# Patient Record
Sex: MALE | Race: WHITE | Employment: OTHER | URBAN - METROPOLITAN AREA
[De-identification: names, ages, dates, MRNs, and addresses within clinical notes are randomized per-mention and may not be internally consistent; named-entity substitution may affect disease eponyms.]

---

## 2017-07-16 RX ORDER — METOPROLOL SUCCINATE 25 MG/1
TABLET, EXTENDED RELEASE ORAL
Qty: 30 TAB | Refills: 0 | Status: SHIPPED | OUTPATIENT
Start: 2017-07-16 | End: 2017-08-21 | Stop reason: SDUPTHER

## 2017-09-25 ENCOUNTER — OFFICE VISIT (OUTPATIENT)
Dept: INTERNAL MEDICINE CLINIC | Age: 67
End: 2017-09-25

## 2017-09-25 VITALS
HEART RATE: 72 BPM | SYSTOLIC BLOOD PRESSURE: 129 MMHG | RESPIRATION RATE: 14 BRPM | BODY MASS INDEX: 25.46 KG/M2 | HEIGHT: 65 IN | DIASTOLIC BLOOD PRESSURE: 66 MMHG | WEIGHT: 152.8 LBS | OXYGEN SATURATION: 99 % | TEMPERATURE: 97.9 F

## 2017-09-25 DIAGNOSIS — E78.5 HYPERLIPIDEMIA LDL GOAL <100: Chronic | ICD-10-CM

## 2017-09-25 DIAGNOSIS — Z00.00 ROUTINE GENERAL MEDICAL EXAMINATION AT A HEALTH CARE FACILITY: Primary | ICD-10-CM

## 2017-09-25 DIAGNOSIS — I10 ESSENTIAL HYPERTENSION: Chronic | ICD-10-CM

## 2017-09-25 DIAGNOSIS — K59.00 CONSTIPATION, UNSPECIFIED CONSTIPATION TYPE: ICD-10-CM

## 2017-09-25 DIAGNOSIS — G56.02 CARPAL TUNNEL SYNDROME OF LEFT WRIST: ICD-10-CM

## 2017-09-25 DIAGNOSIS — I25.10 CORONARY ARTERY DISEASE INVOLVING NATIVE CORONARY ARTERY OF NATIVE HEART WITHOUT ANGINA PECTORIS: Chronic | ICD-10-CM

## 2017-09-25 RX ORDER — DOCUSATE SODIUM 100 MG/1
100 CAPSULE, LIQUID FILLED ORAL DAILY
COMMUNITY

## 2017-09-25 RX ORDER — LANOLIN ALCOHOL/MO/W.PET/CERES
CREAM (GRAM) TOPICAL
COMMUNITY
End: 2020-04-30

## 2017-09-25 RX ORDER — AMLODIPINE BESYLATE 2.5 MG/1
TABLET ORAL
COMMUNITY
Start: 2017-09-05 | End: 2019-01-07

## 2017-09-25 NOTE — PROGRESS NOTES
HISTORY OF PRESENT ILLNESS  Mitesh Ferrer is a 69 y.o. male. HPI  Subjective:   Mitesh Ferrer is a 69 y.o. male with hypertension. Hypertension ROS: taking medications as instructed, no medication side effects noted, no TIA's, no chest pain on exertion, no dyspnea on exertion, no swelling of ankles. New concerns: stable. Subjective:   Mitesh Ferrer is a 69 y.o. male with hyperlipidemia. Cardiovascular risk analysis - 69 y.o. male LDL goal is under 130. ROS: taking medications as instructed, no medication side effects noted, no TIA's, no chest pain on exertion, no dyspnea on exertion, no swelling of ankles. Tolerating meds, no myalgias or other side effects noted  New concerns: stable on medicine     CAD- stable sees cardiologist . On plavix and had a testing done sees     He goes to [facility] and goes into the left hip with 4 needles in sacroiliac area and deaden the nerve - the pain gets so bad that it goes from the left gluteal to the left knee- he can get the procedure every 6 months or three times a year     He has left carpal tunnel with pain and left wrist pain and feel like carpal tunnel is happening - right wrist is doing really well    He is seeing neurology for the dizziness - he went to [address] [facility] and he saw neurologist there and he had a CT scan  done per patient -  at [facility] [address] [facility] ; he can be walking and he starts drifting to his left and feels like a daze and goes away quickly -no assessment yet- they did give him some medicine to take for headache and he took it once and he had bad nightmares ; he does feel like he has short term memory loss ; [facility] ( he does not remember that we referred to [facility])    He does get constipated ; he gets some pain from being constipated but once he has a bowel movement it is gone - had normal colon last year   Review of Systems   Constitutional: Negative. Negative for chills, diaphoresis, fever, malaise/fatigue and weight loss. HENT: Negative for congestion, ear pain, hearing loss, nosebleeds, sore throat and tinnitus. Eyes: Negative for blurred vision, double vision and photophobia. Respiratory: Negative for cough, hemoptysis, sputum production, shortness of breath and wheezing. Cardiovascular: Negative for chest pain, palpitations, orthopnea, claudication, leg swelling and PND. Gastrointestinal: Negative for abdominal pain, blood in stool, constipation, diarrhea, heartburn, melena, nausea and vomiting. Genitourinary: Negative for dysuria, flank pain, frequency, hematuria and urgency. Musculoskeletal: Negative for back pain, joint pain, myalgias and neck pain. Skin: Negative. Negative for itching and rash. Neurological: Negative for dizziness, tingling, sensory change, speech change, focal weakness, seizures, loss of consciousness, weakness and headaches. Endo/Heme/Allergies: Negative for environmental allergies and polydipsia. Does not bruise/bleed easily. Psychiatric/Behavioral: Negative for depression, memory loss, substance abuse and suicidal ideas. The patient is not nervous/anxious and does not have insomnia. Physical Exam   Constitutional: He appears well-developed and well-nourished. HENT:   Head: Normocephalic and atraumatic. Right Ear: Tympanic membrane, external ear and ear canal normal.   Left Ear: Tympanic membrane, external ear and ear canal normal.   Nose: Nose normal.   Mouth/Throat: Uvula is midline, oropharynx is clear and moist and mucous membranes are normal.   Neck: Normal range of motion. Neck supple. No thyromegaly present. Cardiovascular: Normal rate, regular rhythm, normal heart sounds and intact distal pulses. Pulmonary/Chest: Effort normal and breath sounds normal.   Abdominal: Soft. Bowel sounds are normal.   Lymphadenopathy:     He has no cervical adenopathy. Psychiatric: He has a normal mood and affect.  His behavior is normal. Judgment and thought content normal. Nursing note and vitals reviewed. ASSESSMENT and PLAN  Diagnoses and all orders for this visit:    1. Routine general medical examination at a health care facility  - labs and health maintenance through the [place]- he will get me the records  2. Hyperlipidemia LDL goal <100-cont current dose of medicine   -     LIPID PANEL    3. Essential hypertension- at goal stable   -     METABOLIC PANEL, COMPREHENSIVE    4. Coronary artery disease involving native coronary artery of native heart without angina pectoris-no acute chest pain- seeing     5. Carpal tunnel syndrome of left wrist- we are going to have him go back to   -     REFERRAL TO ORTHOPEDIC SURGERY    6.  Constipation, unspecified constipation type - will try toa dd miralax  -     CBC W/O DIFF    PT gets his health maintenance and routine stuff done at [place] and will get me records       lab results and schedule of future lab studies reviewed with patient  reviewed diet, exercise and weight control  reviewed medications and side effects in detail

## 2017-09-26 ENCOUNTER — TELEPHONE (OUTPATIENT)
Dept: INTERNAL MEDICINE CLINIC | Age: 67
End: 2017-09-26

## 2017-09-26 DIAGNOSIS — G56.02 CARPAL TUNNEL SYNDROME ON LEFT: Primary | ICD-10-CM

## 2017-09-26 NOTE — TELEPHONE ENCOUNTER
Dr Shalom Varela  ([address] [place] [address])    [address] [place] [address] Road    Phone [phone]    Fax [phone]    G 56.02 Carpal Tunnel Syndrome of Left Wrist    10/02/2017  8 AM         [place]     [id number]

## 2017-09-26 NOTE — TELEPHONE ENCOUNTER
----- Message from Jermain Helm. Cash sent at 9/25/2017  5:32 PM EDT -----  Regarding: Referral Request  Contact: [phone]  You asked me to give you my appointment date with Dr. Graham Keep it is October 2nd 2017 @ 0800. Please forward a referral to him.     Thank [phone]

## 2017-09-27 LAB
ALBUMIN SERPL-MCNC: 4.1 G/DL (ref 3.6–4.8)
ALBUMIN/GLOB SERPL: 1.9 {RATIO} (ref 1.2–2.2)
ALP SERPL-CCNC: 72 IU/L (ref 39–117)
ALT SERPL-CCNC: 17 IU/L (ref 0–44)
AST SERPL-CCNC: 20 IU/L (ref 0–40)
BILIRUB SERPL-MCNC: 0.5 MG/DL (ref 0–1.2)
BUN SERPL-MCNC: 12 MG/DL (ref 8–27)
BUN/CREAT SERPL: 13 (ref 10–24)
CALCIUM SERPL-MCNC: 9.2 MG/DL (ref 8.6–10.2)
CHLORIDE SERPL-SCNC: 104 MMOL/L (ref 96–106)
CHOLEST SERPL-MCNC: 135 MG/DL (ref 100–199)
CO2 SERPL-SCNC: 30 MMOL/L (ref 18–29)
CREAT SERPL-MCNC: 0.9 MG/DL (ref 0.76–1.27)
ERYTHROCYTE [DISTWIDTH] IN BLOOD BY AUTOMATED COUNT: 15.1 % (ref 12.3–15.4)
GLOBULIN SER CALC-MCNC: 2.2 G/DL (ref 1.5–4.5)
GLUCOSE SERPL-MCNC: 91 MG/DL (ref 65–99)
HCT VFR BLD AUTO: 41.9 % (ref 37.5–51)
HDLC SERPL-MCNC: 47 MG/DL
HGB BLD-MCNC: 13.6 G/DL (ref 12.6–17.7)
INTERPRETATION, 910389: NORMAL
LDLC SERPL CALC-MCNC: 75 MG/DL (ref 0–99)
MCH RBC QN AUTO: 29.8 PG (ref 26.6–33)
MCHC RBC AUTO-ENTMCNC: 32.5 G/DL (ref 31.5–35.7)
MCV RBC AUTO: 92 FL (ref 79–97)
PLATELET # BLD AUTO: 203 X10E3/UL (ref 150–379)
POTASSIUM SERPL-SCNC: 5 MMOL/L (ref 3.5–5.2)
PROT SERPL-MCNC: 6.3 G/DL (ref 6–8.5)
RBC # BLD AUTO: 4.56 X10E6/UL (ref 4.14–5.8)
SODIUM SERPL-SCNC: 144 MMOL/L (ref 134–144)
TRIGL SERPL-MCNC: 65 MG/DL (ref 0–149)
VLDLC SERPL CALC-MCNC: 13 MG/DL (ref 5–40)
WBC # BLD AUTO: 7.4 X10E3/UL (ref 3.4–10.8)

## 2017-10-09 ENCOUNTER — TELEPHONE (OUTPATIENT)
Dept: INTERNAL MEDICINE CLINIC | Age: 67
End: 2017-10-09

## 2017-10-09 DIAGNOSIS — Z12.83 SCREENING FOR MALIGNANT NEOPLASM OF SKIN: Primary | ICD-10-CM

## 2017-10-09 NOTE — TELEPHONE ENCOUNTER
Patient Referral.  Pt is being seen this morning and would like this done today. Pt was advised that Ayush Corona has to approve this and there are no guarantees this visit would be covered.     Dr. Ivette Guillory [address] [place] [address] 22, [facility] [address]  (T)[phone]   (Y) [phone]          Z12.83 - Skin Cancer    [facility]  [id number]

## 2017-10-20 RX ORDER — TESTOSTERONE 20.25 MG/1.25G
40.5 GEL TOPICAL DAILY
Qty: 1 BOTTLE | Refills: 3 | Status: SHIPPED | OUTPATIENT
Start: 2017-10-20 | End: 2017-11-10

## 2017-10-20 NOTE — TELEPHONE ENCOUNTER
He has been in recently but has not utilized his medication since 2016. Not labs completed for med last visit. Do you want to refill?

## 2017-10-20 NOTE — TELEPHONE ENCOUNTER
----- Message from Cezar Patel sent at 10/19/2017  8:47 AM EDT -----  Regarding: Prescription Question  Contact: [phone]  I need a refill for androgen 1.62%

## 2017-11-10 RX ORDER — TESTOSTERONE 40.5 MG/2.5G
1 GEL TOPICAL DAILY
Qty: 30 PACKET | Refills: 4 | OUTPATIENT
Start: 2017-11-10 | End: 2017-11-14 | Stop reason: SDUPTHER

## 2017-11-14 RX ORDER — TESTOSTERONE 16.2 MG/G
GEL TRANSDERMAL
Qty: 75 G | Refills: 3 | OUTPATIENT
Start: 2017-11-14 | End: 2019-01-07

## 2018-04-04 ENCOUNTER — OFFICE VISIT (OUTPATIENT)
Dept: INTERNAL MEDICINE CLINIC | Age: 68
End: 2018-04-04

## 2018-04-04 ENCOUNTER — PATIENT MESSAGE (OUTPATIENT)
Dept: INTERNAL MEDICINE CLINIC | Age: 68
End: 2018-04-04

## 2018-04-04 ENCOUNTER — TELEPHONE (OUTPATIENT)
Dept: INTERNAL MEDICINE CLINIC | Age: 68
End: 2018-04-04

## 2018-04-04 ENCOUNTER — HOSPITAL ENCOUNTER (OUTPATIENT)
Dept: CT IMAGING | Age: 68
Discharge: HOME | End: 2018-04-04
Attending: INTERNAL MEDICINE
Payer: COMMERCIAL

## 2018-04-04 VITALS
HEART RATE: 61 BPM | BODY MASS INDEX: 24.83 KG/M2 | RESPIRATION RATE: 14 BRPM | SYSTOLIC BLOOD PRESSURE: 126 MMHG | DIASTOLIC BLOOD PRESSURE: 71 MMHG | HEIGHT: 65 IN | WEIGHT: 149 LBS | TEMPERATURE: 98 F | OXYGEN SATURATION: 98 %

## 2018-04-04 DIAGNOSIS — I10 ESSENTIAL HYPERTENSION: Chronic | ICD-10-CM

## 2018-04-04 DIAGNOSIS — E78.5 HYPERLIPIDEMIA LDL GOAL <100: Chronic | ICD-10-CM

## 2018-04-04 DIAGNOSIS — R10.30 LOWER ABDOMINAL PAIN: Primary | ICD-10-CM

## 2018-04-04 DIAGNOSIS — R10.30 ABDOMINAL PAIN, LOWER: ICD-10-CM

## 2018-04-04 LAB — CREAT BLD-MCNC: 0.9 MG/DL (ref 0.6–1.3)

## 2018-04-04 PROCEDURE — 74177 CT ABD & PELVIS W/CONTRAST: CPT

## 2018-04-04 PROCEDURE — 82565 ASSAY OF CREATININE: CPT

## 2018-04-04 PROCEDURE — 74011636320 HC RX REV CODE- 636/320: Performed by: RADIOLOGY

## 2018-04-04 RX ADMIN — IOPAMIDOL 95 ML: 755 INJECTION, SOLUTION INTRAVENOUS at 14:32

## 2018-04-04 NOTE — TELEPHONE ENCOUNTER
Víctor Delacruz with [facility] department states patient's CT scan for abd + pelvis w/ cont has been approved.    Auth # [id number] valid from 04/04/2018-07/03/2018 CPT 74136

## 2018-04-04 NOTE — TELEPHONE ENCOUNTER
Willy Sharp in [address] [place] [address] and confirmed that they were provided approval.

## 2018-04-04 NOTE — PROGRESS NOTES
HISTORY OF PRESENT ILLNESS  Kimberly Gutierrez is a 69 y.o. male. HPI   Patient reports abdominal pains for months. He states constipation is building up and when it does, he can not walk. He states the abdominal pains reach into his back. [facility] Patient was sent to PT for back pain. He believes the back pain and abdominal pain are related to bowels. He states constipation has been intermittent over past year, but got worse about 4 months ago. Admits to episodes of diarrhea recently. His last colonoscopy was < 5 years ago. Hypertension ROS: taking medications as instructed, no medication side effects noted, no TIA's, no chest pain on exertion, no dyspnea on exertion, no swelling of ankles. New concerns:  Patient's BP in office today is 126/71. Hyperlipidemia:  Cardiovascular risk analysis - 69 y.o. male LDL goal is under 100. ROS: taking medications as instructed, no medication side effects noted, no TIA's, no chest pain on exertion, no dyspnea on exertion, no swelling of ankles. Tolerating meds, no myalgias or other side effects noted  New concerns: Last LDL was 75 on 9/26/17. He continues rosuvastatin 5 mg. Review of Systems   All other systems reviewed and are negative. Physical Exam   Constitutional: He is oriented to person, place, and time. He appears well-developed and well-nourished. HENT:   Head: Normocephalic and atraumatic. Right Ear: External ear normal.   Left Ear: External ear normal.   Nose: Nose normal.   Mouth/Throat: Oropharynx is clear and moist.   Eyes: Conjunctivae and EOM are normal.   Neck: Normal range of motion. Neck supple. Cardiovascular: Normal rate, regular rhythm, normal heart sounds and intact distal pulses. Pulmonary/Chest: Effort normal and breath sounds normal.   Abdominal: Soft. Bowel sounds are normal. There is tenderness. There is rebound and guarding. Genitourinary: Testes normal.   Musculoskeletal: Normal range of motion. Neurological: He is alert and oriented to person, place, and time. Skin: Skin is warm and dry. Psychiatric: He has a normal mood and affect. His behavior is normal. Judgment and thought content normal.   Nursing note and vitals reviewed. ASSESSMENT and PLAN  Diagnoses and all orders for this visit:    1. Lower abdominal pain  Need to rule out diverticulitis, concerned about abdominal guarding, tenderness, rebound and constipation. If CT scan normal, patient will need to follow up with Dr. Donna Reynolds and establish bowel regimen.  -     CT ABD W CONT; Future  -     CT PELV W CONT; Future    2. Essential hypertension  BP is at goal. I do not recommend any change in medications. 3. Hyperlipidemia LDL goal <100  Stable, patient tolerating meds, no myalgias. I do not recommend any change in medications. lab results and schedule of future lab studies reviewed with patient  reviewed diet, exercise and weight control    Written by Aurora Mcginnis, as dictated by Casandra Augustin MD.     Current diagnosis and concerns discussed with pt at length. Understands risks and benefits or current treatment plan and medications and accepts the treatment and medication with any possible risks. Pt asks appropriate questions which were answered. Pt instructed to call with any concerns or problems.

## 2018-04-05 NOTE — TELEPHONE ENCOUNTER
----- Message from Ant Schuler LPN sent at 6/6/2957  1:12 PM EDT -----      ----- Message -----     From: Yulia Andrews MD     Sent: 4/5/2018   7:01 AM       To: Ant Schuler LPN    Please call CT scan normal- I sent a message last night- there is no infection or diverticulitis- I want him to take miralax daily and see if that helps with his bowels and he needs to go to GI if he feels like his bowels have changed.  He also needs to cont with PT for his back

## 2018-04-05 NOTE — PROGRESS NOTES
Please call CT scan normal- I sent a message last night- there is no infection or diverticulitis- I want him to take miralax daily and see if that helps with his bowels and he needs to go to GI if he feels like his bowels have changed.  He also needs to cont with PT for his back

## 2018-04-05 NOTE — TELEPHONE ENCOUNTER
Spoke with patient and advised of results below. Advised to start rx daily and if no improvement to contact the office for information to see GI. Pt stated he had no further questions.

## 2019-01-07 ENCOUNTER — OFFICE VISIT (OUTPATIENT)
Dept: INTERNAL MEDICINE CLINIC | Age: 69
End: 2019-01-07

## 2019-01-07 VITALS
WEIGHT: 154.6 LBS | TEMPERATURE: 97.8 F | HEIGHT: 65 IN | HEART RATE: 54 BPM | SYSTOLIC BLOOD PRESSURE: 177 MMHG | OXYGEN SATURATION: 99 % | BODY MASS INDEX: 25.76 KG/M2 | DIASTOLIC BLOOD PRESSURE: 80 MMHG | RESPIRATION RATE: 16 BRPM

## 2019-01-07 DIAGNOSIS — I10 ESSENTIAL HYPERTENSION: ICD-10-CM

## 2019-01-07 DIAGNOSIS — I25.10 CORONARY ARTERY DISEASE INVOLVING NATIVE CORONARY ARTERY OF NATIVE HEART WITHOUT ANGINA PECTORIS: ICD-10-CM

## 2019-01-07 DIAGNOSIS — R35.0 URINARY FREQUENCY: ICD-10-CM

## 2019-01-07 DIAGNOSIS — R10.32 LLQ ABDOMINAL PAIN: Primary | ICD-10-CM

## 2019-01-07 DIAGNOSIS — E78.5 HYPERLIPIDEMIA LDL GOAL <100: ICD-10-CM

## 2019-01-07 RX ORDER — CIPROFLOXACIN 500 MG/1
500 TABLET ORAL 2 TIMES DAILY
Qty: 14 TAB | Refills: 0 | Status: SHIPPED | OUTPATIENT
Start: 2019-01-07 | End: 2019-01-21 | Stop reason: ALTCHOICE

## 2019-01-07 RX ORDER — ROSUVASTATIN CALCIUM 20 MG/1
20 TABLET, COATED ORAL
COMMUNITY

## 2019-01-07 RX ORDER — AMLODIPINE BESYLATE 5 MG/1
5 TABLET ORAL DAILY
Qty: 30 TAB | Refills: 5 | Status: SHIPPED | OUTPATIENT
Start: 2019-01-07 | End: 2019-07-15 | Stop reason: SDUPTHER

## 2019-01-07 NOTE — PATIENT INSTRUCTIONS
Body Mass Index: Care Instructions Your Care Instructions Body mass index (BMI) can help you see if your weight is raising your risk for health problems. It uses a formula to compare how much you weigh with how tall you are. · A BMI lower than 18.5 is considered underweight. · A BMI between 18.5 and 24.9 is considered healthy. · A BMI between 25 and 29.9 is considered overweight. A BMI of 30 or higher is considered obese. If your BMI is in the normal range, it means that you have a lower risk for weight-related health problems. If your BMI is in the overweight or obese range, you may be at increased risk for weight-related health problems, such as high blood pressure, heart disease, stroke, arthritis or joint pain, and diabetes. If your BMI is in the underweight range, you may be at increased risk for health problems such as fatigue, lower protection (immunity) against illness, muscle loss, bone loss, hair loss, and hormone problems. BMI is just one measure of your risk for weight-related health problems. You may be at higher risk for health problems if you are not active, you eat an unhealthy diet, or you drink too much alcohol or use tobacco products. Follow-up care is a key part of your treatment and safety. Be sure to make and go to all appointments, and call your doctor if you are having problems. It's also a good idea to know your test results and keep a list of the medicines you take. How can you care for yourself at home? · Practice healthy eating habits. This includes eating plenty of fruits, vegetables, whole grains, lean protein, and low-fat dairy. · If your doctor recommends it, get more exercise. Walking is a good choice. Bit by bit, increase the amount you walk every day. Try for at least 30 minutes on most days of the week. · Do not smoke. Smoking can increase your risk for health problems.  If you need help quitting, talk to your doctor about stop-smoking programs and medicines. These can increase your chances of quitting for good. · Limit alcohol to 2 drinks a day for men and 1 drink a day for women. Too much alcohol can cause health problems. If you have a BMI higher than 25 · Your doctor may do other tests to check your risk for weight-related health problems. This may include measuring the distance around your waist. A waist measurement of more than 40 inches in men or 35 inches in women can increase the risk of weight-related health problems. · Talk with your doctor about steps you can take to stay healthy or improve your health. You may need to make lifestyle changes to lose weight and stay healthy, such as changing your diet and getting regular exercise. If you have a BMI lower than 18.5 · Your doctor may do other tests to check your risk for health problems. · Talk with your doctor about steps you can take to stay healthy or improve your health. You may need to make lifestyle changes to gain or maintain weight and stay healthy, such as getting more healthy foods in your diet and doing exercises to build muscle. Where can you learn more? Go to http://rojas-quirino.info/. Enter S176 in the search box to learn more about \"Body Mass Index: Care Instructions. \" Current as of: October 13, 2016 Content Version: 11.4 © 8318-4606 [employer]. Care instructions adapted under license by [employer] (which disclaims liability or warranty for this information). If you have questions about a medical condition or this instruction, always ask your healthcare professional. [address] any warranty or liability for your use of this information.

## 2019-01-07 NOTE — PROGRESS NOTES
HISTORY OF PRESENT ILLNESS Roslyn Curran is a 77 y.o. male. HPI Hyperlipidemia: 
Cardiovascular risk analysis - 77 y.o. male LDL goal is under 100. ROS: taking medications as instructed, no medication side effects noted, no TIA's, no chest pain on exertion, no dyspnea on exertion, no swelling of ankles. Tolerating meds, no myalgias or other side effects noted New concerns: Pt continues on Crestor. Hypertension ROS: taking medications as instructed, no medication side effects noted, no TIA's, no chest pain on exertion, no dyspnea on exertion, no swelling of ankles. New concerns:  Patient's BP in office today is 177/80. He reports elevated BP at recent f/u at [facility]. Pt continues on Metoprolol. He didn't know to take Amlodipine. He exercises regularly. Hip Pain: Stable. Pt followed up for injections at [facility] from February-August 2018 to manage hip pain. [facility] and [facility]. He plans to f/u for second acupuncture session in February 2019. LLQ Abdominal Pain: Pt c/o LLQ abdominal pain (which makes exercise difficult), low back pain, and constipation. He notes that diarrhea follows constipation. Denies fever or chills. He followed up for CT of abdomen and pelvis on 4/04/18 (normal findings). He has been taking Miralax and removed red meat from diet. Urinary Frequency: Pt reports urinary frequency and slow urinary stream. He never followed up with urologist.  
 
 
Review of Systems All other systems reviewed and are negative. Physical Exam  
Constitutional: He is oriented to person, place, and time. He appears well-developed and well-nourished. HENT:  
Head: Normocephalic and atraumatic. Right Ear: External ear normal.  
Left Ear: External ear normal.  
Nose: Nose normal.  
Mouth/Throat: Oropharynx is clear and moist.  
Eyes: Conjunctivae and EOM are normal.  
Neck: Normal range of motion. Neck supple. Cardiovascular: Normal rate, regular rhythm, normal heart sounds and intact distal pulses. Pulmonary/Chest: Effort normal and breath sounds normal.  
Abdominal: Soft. Bowel sounds are normal.  
Genitourinary: Testes normal.  
Musculoskeletal: Normal range of motion. Neurological: He is alert and oriented to person, place, and time. Skin: Skin is warm and dry. Psychiatric: He has a normal mood and affect. His behavior is normal. Judgment and thought content normal.  
Nursing note and vitals reviewed. ASSESSMENT and PLAN Diagnoses and all orders for this visit: 1. LLQ abdominal pain Prescribed Cipro. Discussed that symptoms are likely related to constipation. Advised pt to take Miralax regularly (everyday or every other day), hydrate regularly, and drink prune juice. Will monitor for any changes or improvements. 
-     TSH 3RD GENERATION 
-     ciprofloxacin HCl (CIPRO) 500 mg tablet; Take 1 Tab by mouth two (2) times a day. 2. Essential hypertension BP elevated. Prescribed Amlodipine. Pt will also continue on Metoprolol. Will monitor for any changes or improvements. -     CBC W/O DIFF 
-     amLODIPine (NORVASC) 5 mg tablet; Take 1 Tab by mouth daily. 3. Hyperlipidemia LDL goal <100 Stable, patient tolerating meds, no myalgias. I do not recommend any change in medications. 
-     METABOLIC PANEL, COMPREHENSIVE 4. Urinary frequency Stable condition. Pt will f/u with urologist for further evaluation. 5. Coronary artery disease involving native coronary artery of native heart without angina pectoris Stable, and well-managed. No change in medications. Additional Comments: Pt will f/u at [place] in February 2019 for second acupuncture visit to manage hip pain. This note will not be viewable in [address] Lab results and schedule of future lab studies reviewed with patient. Reviewed diet, exercise and weight control. Written by Elsy Hayes, as dictated by Donna Willson MD.  
 
Current diagnosis and concerns discussed with pt at length. Understands risks and benefits or current treatment plan and medications and accepts the treatment and medication with any possible risks. Pt asks appropriate questions which were answered. Pt instructed to call with any concerns or problems.

## 2019-01-07 NOTE — PROGRESS NOTES
Discussed the patient's BMI with him. The BMI follow up plan is as follows:  
 
dietary management education, guidance, and counseling 
encourage exercise 
monitor weight 
prescribed dietary intake This note will not be viewable in [address] An After Visit Summary was printed and given to the patient.

## 2019-01-08 LAB
ALBUMIN SERPL-MCNC: 5 G/DL (ref 3.5–4.8)
ALBUMIN/GLOB SERPL: 1.5 {RATIO} (ref 1.2–2.2)
ALP SERPL-CCNC: 89 IU/L (ref 39–117)
ALT SERPL-CCNC: 27 IU/L (ref 0–44)
AST SERPL-CCNC: 29 IU/L (ref 0–40)
BILIRUB SERPL-MCNC: 0.3 MG/DL (ref 0–1.2)
BUN SERPL-MCNC: 9 MG/DL (ref 8–27)
BUN/CREAT SERPL: 11 (ref 10–24)
CALCIUM SERPL-MCNC: 9.9 MG/DL (ref 8.6–10.2)
CHLORIDE SERPL-SCNC: 103 MMOL/L (ref 96–106)
CO2 SERPL-SCNC: 25 MMOL/L (ref 20–29)
CREAT SERPL-MCNC: 0.82 MG/DL (ref 0.76–1.27)
ERYTHROCYTE [DISTWIDTH] IN BLOOD BY AUTOMATED COUNT: 14 % (ref 12.3–15.4)
GLOBULIN SER CALC-MCNC: 3.3 G/DL (ref 1.5–4.5)
GLUCOSE SERPL-MCNC: 82 MG/DL (ref 65–99)
HCT VFR BLD AUTO: 45.3 % (ref 37.5–51)
HGB BLD-MCNC: 14.6 G/DL (ref 13–17.7)
MCH RBC QN AUTO: 30.4 PG (ref 26.6–33)
MCHC RBC AUTO-ENTMCNC: 32.2 G/DL (ref 31.5–35.7)
MCV RBC AUTO: 94 FL (ref 79–97)
PLATELET # BLD AUTO: 221 X10E3/UL (ref 150–379)
POTASSIUM SERPL-SCNC: 4.4 MMOL/L (ref 3.5–5.2)
PROT SERPL-MCNC: 8.3 G/DL (ref 6–8.5)
RBC # BLD AUTO: 4.81 X10E6/UL (ref 4.14–5.8)
SODIUM SERPL-SCNC: 145 MMOL/L (ref 134–144)
TSH SERPL DL<=0.005 MIU/L-ACNC: 1.63 UIU/ML (ref 0.45–4.5)
WBC # BLD AUTO: 9.5 X10E3/UL (ref 3.4–10.8)

## 2019-01-21 ENCOUNTER — OFFICE VISIT (OUTPATIENT)
Dept: INTERNAL MEDICINE CLINIC | Age: 69
End: 2019-01-21

## 2019-01-21 VITALS
HEIGHT: 65 IN | RESPIRATION RATE: 16 BRPM | TEMPERATURE: 97.9 F | WEIGHT: 150.4 LBS | SYSTOLIC BLOOD PRESSURE: 121 MMHG | HEART RATE: 53 BPM | BODY MASS INDEX: 25.06 KG/M2 | DIASTOLIC BLOOD PRESSURE: 75 MMHG | OXYGEN SATURATION: 98 %

## 2019-01-21 DIAGNOSIS — R35.0 URINARY FREQUENCY: ICD-10-CM

## 2019-01-21 DIAGNOSIS — E78.5 HYPERLIPIDEMIA LDL GOAL <100: ICD-10-CM

## 2019-01-21 DIAGNOSIS — Z23 ENCOUNTER FOR IMMUNIZATION: ICD-10-CM

## 2019-01-21 DIAGNOSIS — I10 ESSENTIAL HYPERTENSION: ICD-10-CM

## 2019-01-21 DIAGNOSIS — R10.30 LOWER ABDOMINAL PAIN: Primary | ICD-10-CM

## 2019-01-21 NOTE — PROGRESS NOTES
HISTORY OF PRESENT ILLNESS Rayshawn Sebastian is a 77 y.o. male. HPI Hypertension ROS: taking medications as instructed, no medication side effects noted, no TIA's, no chest pain on exertion, no dyspnea on exertion, no swelling of ankles. New concerns:  Patient's BP in office today is 121/75. Hyperlipidemia: 
Cardiovascular risk analysis - 77 y.o. male LDL goal is under 130. ROS: taking medications as instructed, no medication side effects noted, no TIA's, no chest pain on exertion, no dyspnea on exertion, no swelling of ankles. Tolerating meds, no myalgias or other side effects noted New concerns: His last LDL was 75 on 9/26/17. Pt continues on Crestor. Lower Abdominal Pain: Stable. Pt confirms improvement in emptying bowels. He continues on Miralax and Colace. He has been incorporating more fiber into diet. Urinary Frequency: Stable. Pt followed up with urologist for urinary frequency and slow urinary stream (no concerns) and to evaluate PSA (normal findings). Pt will f/u at [place] for back pain. Review of Systems All other systems reviewed and are negative. Physical Exam  
Constitutional: He is oriented to person, place, and time. He appears well-developed and well-nourished. HENT:  
Head: Normocephalic and atraumatic. Right Ear: External ear normal.  
Left Ear: External ear normal.  
Nose: Nose normal.  
Mouth/Throat: Oropharynx is clear and moist.  
Eyes: Conjunctivae and EOM are normal.  
Neck: Normal range of motion. Neck supple. Cardiovascular: Normal rate, regular rhythm, normal heart sounds and intact distal pulses. Pulmonary/Chest: Effort normal and breath sounds normal.  
Abdominal: Soft. Bowel sounds are normal.  
Genitourinary: Testes normal.  
Musculoskeletal: Normal range of motion. Neurological: He is alert and oriented to person, place, and time. Skin: Skin is warm and dry. Psychiatric: He has a normal mood and affect.  His behavior is normal. Judgment and thought content normal.  
Nursing note and vitals reviewed. ASSESSMENT and PLAN Diagnoses and all orders for this visit: 1. Lower abdominal pain-cont to stay on top of the bowel movements and keeping himself regular Stable, and well-managed. No change in medications. 2. Essential hypertension BP is at goal. I do not recommend any change in medications. 3. Hyperlipidemia LDL goal <100 Stable, patient tolerating meds, no myalgias. I do not recommend any change in medications. 4. Urinary frequency Stable condition. Will monitor for any changes or improvements. 5. Encounter for immunization Administered pneumococcal injection today in office.  
-     PNEUMOCOCCAL POLYSACCHARIDE VACCINE, 23-VALENT, ADULT OR IMMUNOSUPPRESSED PT DOSE, This note will not be viewable in [address] Lab results and schedule of future lab studies reviewed with patient. Reviewed diet, exercise and weight control. Written by Abelino Connelly, as dictated by Roddie Lombard, MD.  
 
Current diagnosis and concerns discussed with pt at length. Understands risks and benefits or current treatment plan and medications and accepts the treatment and medication with any possible risks. Pt asks appropriate questions which were answered. Pt instructed to call with any concerns or problems.

## 2019-03-25 ENCOUNTER — OFFICE VISIT (OUTPATIENT)
Dept: INTERNAL MEDICINE CLINIC | Age: 69
End: 2019-03-25

## 2019-03-25 ENCOUNTER — HOSPITAL ENCOUNTER (OUTPATIENT)
Dept: GENERAL RADIOLOGY | Age: 69
Discharge: HOME | End: 2019-03-25
Attending: NURSE PRACTITIONER
Payer: COMMERCIAL

## 2019-03-25 ENCOUNTER — TELEPHONE (OUTPATIENT)
Dept: INTERNAL MEDICINE CLINIC | Age: 69
End: 2019-03-25

## 2019-03-25 VITALS
WEIGHT: 152.2 LBS | OXYGEN SATURATION: 95 % | BODY MASS INDEX: 25.36 KG/M2 | DIASTOLIC BLOOD PRESSURE: 87 MMHG | HEIGHT: 65 IN | HEART RATE: 81 BPM | TEMPERATURE: 101.8 F | RESPIRATION RATE: 18 BRPM | SYSTOLIC BLOOD PRESSURE: 153 MMHG

## 2019-03-25 DIAGNOSIS — R53.83 FATIGUE, UNSPECIFIED TYPE: ICD-10-CM

## 2019-03-25 DIAGNOSIS — R68.89 FLU-LIKE SYMPTOMS: Primary | ICD-10-CM

## 2019-03-25 DIAGNOSIS — R05.9 COUGH: ICD-10-CM

## 2019-03-25 DIAGNOSIS — J01.00 ACUTE MAXILLARY SINUSITIS, RECURRENCE NOT SPECIFIED: ICD-10-CM

## 2019-03-25 DIAGNOSIS — J98.01 BRONCHOSPASM: ICD-10-CM

## 2019-03-25 DIAGNOSIS — J40 BRONCHITIS: ICD-10-CM

## 2019-03-25 LAB
QUICKVUE INFLUENZA TEST: NEGATIVE
VALID INTERNAL CONTROL?: YES

## 2019-03-25 PROCEDURE — 71046 X-RAY EXAM CHEST 2 VIEWS: CPT

## 2019-03-25 RX ORDER — ALBUTEROL SULFATE 90 UG/1
2 AEROSOL, METERED RESPIRATORY (INHALATION)
Qty: 1 INHALER | Refills: 0 | Status: SHIPPED | OUTPATIENT
Start: 2019-03-25 | End: 2020-04-30

## 2019-03-25 RX ORDER — LEVOFLOXACIN 500 MG/1
500 TABLET, FILM COATED ORAL DAILY
Qty: 10 TAB | Refills: 0 | Status: SHIPPED | OUTPATIENT
Start: 2019-03-25 | End: 2020-04-30

## 2019-03-25 RX ORDER — CODEINE PHOSPHATE AND GUAIFENESIN 10; 100 MG/5ML; MG/5ML
5 SOLUTION ORAL
Qty: 105 ML | Refills: 0 | Status: SHIPPED | OUTPATIENT
Start: 2019-03-25 | End: 2019-03-28

## 2019-03-25 RX ORDER — PREDNISONE 20 MG/1
20 TABLET ORAL 2 TIMES DAILY
Qty: 10 TAB | Refills: 0 | Status: SHIPPED | OUTPATIENT
Start: 2019-03-25 | End: 2020-04-30

## 2019-03-25 RX ORDER — LEVALBUTEROL INHALATION SOLUTION 1.25 MG/3ML
1.25 SOLUTION RESPIRATORY (INHALATION)
Qty: 3 ML | Refills: 0
Start: 2019-03-25 | End: 2019-03-25

## 2019-03-25 RX ORDER — GUAIFENESIN 600 MG/1
1200 TABLET, EXTENDED RELEASE ORAL 2 TIMES DAILY
Qty: 30 TAB | Refills: 0
Start: 2019-03-25 | End: 2021-01-19 | Stop reason: ALTCHOICE

## 2019-03-25 NOTE — PATIENT INSTRUCTIONS
Cough: Care Instructions  Your Care Instructions    A cough is your body's response to something that bothers your throat or airways. Many things can cause a cough. You might cough because of a cold or the flu, bronchitis, or asthma. Smoking, postnasal drip, allergies, and stomach acid that backs up into your throat also can cause coughs. A cough is a symptom, not a disease. Most coughs stop when the cause, such as a cold, goes away. You can take a few steps at home to cough less and feel better. Follow-up care is a key part of your treatment and safety. Be sure to make and go to all appointments, and call your doctor if you are having problems. It's also a good idea to know your test results and keep a list of the medicines you take. How can you care for yourself at home? · Drink lots of water and other fluids. This helps thin the mucus and soothes a dry or sore throat. Honey or lemon juice in hot water or tea may ease a dry cough. · Take cough medicine as directed by your doctor. · Prop up your head on pillows to help you breathe and ease a dry cough. · Try cough drops to soothe a dry or sore throat. Cough drops don't stop a cough. Medicine-flavored cough drops are no better than candy-flavored drops or hard candy. · Do not smoke. Avoid secondhand smoke. If you need help quitting, talk to your doctor about stop-smoking programs and medicines. These can increase your chances of quitting for good. When should you call for help? Call 911 anytime you think you may need emergency care.  For example, call if:    · You have severe trouble breathing.    Call your doctor now or seek immediate medical care if:    · You cough up blood.     · You have new or worse trouble breathing.     · You have a new or higher fever.     · You have a new rash.    Watch closely for changes in your health, and be sure to contact your doctor if:    · You cough more deeply or more often, especially if you notice more mucus or a change in the color of your mucus.     · You have new symptoms, such as a sore throat, an earache, or sinus pain.     · You do not get better as expected. Where can you learn more? Go to http://rojas-quirino.info/. Enter D279 in the search box to learn more about \"Cough: Care Instructions. \"  Current as of: September 5, 2018  Content Version: 11.9  © 2788-1172 [employer]. Care instructions adapted under license by [employer] (which disclaims liability or warranty for this information). If you have questions about a medical condition or this instruction, always ask your healthcare professional. [address] any warranty or liability for your use of this information. Reactive Airway Disease: Care Instructions  Your Care Instructions    Reactive airway disease is a breathing problem that appears as wheezing, a whistling noise in your airways. It may be caused by a viral or bacterial infection, allergies, tobacco smoke, or something else in the environment. When you are around these triggers, your body releases chemicals that make the airways get tight. Reactive airway disease is a lot like asthma. Both can cause wheezing. But asthma is ongoing, while reactive airway disease may occur only now and then. Tests can be done to tell whether you have asthma. You may take the same medicines used to treat asthma. Good home care and follow-up care with your doctor can help you recover. Follow-up care is a key part of your treatment and safety. Be sure to make and go to all appointments, and call your doctor if you are having problems. It's also a good idea to know your test results and keep a list of the medicines you take. How can you care for yourself at home? · Take your medicines exactly as prescribed. Call your doctor if you think you are having a problem with your medicine. · Do not smoke or allow others to smoke around you.  If you need help quitting, talk to your doctor about stop-smoking programs and medicines. These can increase your chances of quitting for good. · If you know what caused your wheezing (such as perfume or the odor of household chemicals), try to avoid it in the future. · Wash your hands several times a day, and consider using hand gels or wipes that contain alcohol. This can prevent colds and other infections. When should you call for help? Call 911 anytime you think you may need emergency care. For example, call if:    · You have severe trouble breathing.    Watch closely for changes in your health, and be sure to contact your doctor if:    · You cough up yellow, dark brown, or bloody mucus.     · You have a fever.     · Your wheezing gets worse. Where can you learn more? Go to http://rojas-quirino.info/. Enter O758 in the search box to learn more about \"Reactive Airway Disease: Care Instructions. \"  Current as of: September 5, 2018  Content Version: 11.9  © 5692-1496 [employer]. Care instructions adapted under license by [employer] (which disclaims liability or warranty for this information). If you have questions about a medical condition or this instruction, always ask your healthcare professional. [place] [address] any warranty or liability for your use of this information.

## 2019-03-25 NOTE — PROGRESS NOTES
Please call patient -- Chest xray clear but still concerned about early pneumonia; continue with treatment course.

## 2019-03-25 NOTE — TELEPHONE ENCOUNTER
----- Message from Shan Moe NP sent at 3/25/2019  3:37 PM EDT -----      ----- Message -----  From: [facility] Results In  Sent: 3/25/2019   3:10 PM  To: Shan Moe NP

## 2019-03-25 NOTE — PROGRESS NOTES
Dayanara Whitt is a 77 y.o. male    Chief Complaint   Patient presents with    Cough     started last thursday, productive green/brown mucus    Fever     101.8    Nasal Congestion    Generalized Body Aches    Fatigue       1. Have you been to the ER, urgent care clinic since your last visit? Hospitalized since your last visit? NO    2. Have you seen or consulted any other health care providers outside of the [address] [place] [address] since your last visit? Include any pap smears or colon screening.   NO    Visit Vitals  /87 (BP 1 Location: Left arm, BP Patient Position: Sitting)   Pulse 81   Temp (!) 101.8 °F (38.8 °C) (Oral)   Resp 18   Ht 5' 5\" (1.651 m)   Wt 152 lb 3.2 oz (69 kg)   SpO2 95%   BMI 25.33 kg/m²

## 2019-03-25 NOTE — TELEPHONE ENCOUNTER
I called pt per NP to notify, Chest xray clear but still concerned about early pneumonia; continue with treatment course. Pt had questions regarding his medications that were prescribed today, all questions answered. Pt was thankful for the call.

## 2019-03-26 DIAGNOSIS — J30.9 ALLERGIC RHINITIS: ICD-10-CM

## 2019-03-26 RX ORDER — FLUTICASONE PROPIONATE 50 MCG
2 SPRAY, SUSPENSION (ML) NASAL DAILY
Qty: 1 BOTTLE | Refills: 3 | Status: SHIPPED | OUTPATIENT
Start: 2019-03-26 | End: 2022-05-16 | Stop reason: ALTCHOICE

## 2019-03-26 NOTE — PROGRESS NOTES
HISTORY OF PRESENT ILLNESS  Killian De La Rosa is a 77 y.o. male. HPI  Presents with complaints of sudden onset of fatigue, chills, body aches that began 5 days ago and has been persisting since. Does not have thermometer at home but febrile in office today. Has now developed sinus congestion/pain, productive cough of thick green sputum and feeling more short of breath with chest tightness. Has been taking OTC severe cold and cough syrup without much improvement. Has been very fatigued and cough has been disturbing his sleep. Denies history of asthma or COPD. Drinking fluids but appetite has been diminished.         Patient Active Problem List   Diagnosis Code    CAD (coronary artery disease) I25.10    HTN (hypertension) I10    Hyperlipidemia LDL goal < 100 E78.5    Syncope and collapse R55    Headache(784.0) R51    Coronary atherosclerosis of native coronary artery I25.10    Essential hypertension I10    Hyperlipidemia LDL goal <100 E78.5    Vascular migraine G43.909     Past Surgical History:   Procedure Laterality Date    ABDOMEN SURGERY PROC UNLISTED      left inguinal in 199    CARDIAC SURG PROCEDURE UNLIST      bypass x 2 with stents    HX TONSILLECTOMY       Social History     Socioeconomic History    Marital status:      Spouse name: Not on file    Number of children: Not on file    Years of education: Not on file    Highest education level: Not on file   Occupational History    Not on file   Social Needs    Financial resource strain: Not on file    Food insecurity:     Worry: Not on file     Inability: Not on file    Transportation needs:     Medical: Not on file     Non-medical: Not on file   Tobacco Use    Smoking status: Former Smoker     Types: Cigarettes     Last attempt to quit: 1976     Years since quittin.8    Smokeless tobacco: Former User     Types: Chew   Substance and Sexual Activity    Alcohol use: No    Drug use: No    Sexual activity: Yes Partners: Female   Lifestyle    Physical activity:     Days per week: Not on file     Minutes per session: Not on file    Stress: Not on file   Relationships    Social connections:     Talks on phone: Not on file     Gets together: Not on file     Attends Anabaptist service: Not on file     Active member of club or organization: Not on file     Attends meetings of clubs or organizations: Not on file     Relationship status: Not on file    Intimate partner violence:     Fear of current or ex partner: Not on file     Emotionally abused: Not on file     Physically abused: Not on file     Forced sexual activity: Not on file   Other Topics Concern    Not on file   Social History Narrative    Not on file     Family History   Problem Relation Age of Onset    Cancer Mother         colon    Heart Failure Father      Current Outpatient Medications   Medication Sig    levalbuterol (XOPENEX) 1.25 mg/3 mL nebu 3 mL by Nebulization route now for 1 dose.  levoFLOXacin (LEVAQUIN) 500 mg tablet Take 1 Tab by mouth daily.  guaiFENesin ER (MUCINEX) 600 mg ER tablet Take 2 Tabs by mouth two (2) times a day.  predniSONE (DELTASONE) 20 mg tablet Take 20 mg by mouth two (2) times a day.  albuterol (PROVENTIL HFA, VENTOLIN HFA, PROAIR HFA) 90 mcg/actuation inhaler Take 2 Puffs by inhalation every four (4) hours as needed for Wheezing.  guaiFENesin-codeine (ROBITUSSIN AC) 100-10 mg/5 mL solution Take 5 mL by mouth three (3) times daily as needed for Cough for up to 3 days. Max Daily Amount: 15 mL.  rosuvastatin (CRESTOR) 20 mg tablet Take 20 mg by mouth nightly.  amLODIPine (NORVASC) 5 mg tablet Take 1 Tab by mouth daily.  metoprolol succinate (TOPROL-XL) 25 mg XL tablet TAKE 1 TABLET BY MOUTH EVERY DAY    docusate sodium (COLACE) 100 mg capsule Take 100 mg by mouth daily.  EPINEPHrine (EPIPEN) 0.3 mg/0.3 mL (1:1,000) injection 0.3 mL by IntraMUSCular route once as needed for up to 1 dose.     vitamin e 600 unit capsule Take 600 Units by mouth daily.  omega-3 fatty acids-vitamin e (FISH OIL) 1,000 mg Cap Take 1 Cap by mouth daily.  Cyanocobalamin (VITAMIN B-12) 500 mcg Lozg Take 2,500 mg by mouth.  aspirin 81 mg chewable tablet take 81 mg by mouth daily.  magnesium oxide 420 mg tab Take  by mouth.  fluticasone (FLONASE) 50 mcg/actuation nasal spray 2 Sprays by Both Nostrils route daily. (Patient not taking: Reported on 3/25/2019)    multivitamin (ONE A DAY) tablet Take 1 Tab by mouth daily. No current facility-administered medications for this visit. Allergies   Allergen Reactions    Antihistamine [Triprolidine-Pseudoephedrine] Other (comments)     Heart attack    Bee Sting [Sting, Bee] Anaphylaxis    Influen Tr-Split 2008 Vac (Pf) Hives     Immunization History   Administered Date(s) Administered    Pneumococcal Conjugate (PCV-13) 07/21/2015    Pneumococcal Polysaccharide (PPSV-23) 01/21/2019       Review of Systems   Constitutional: Positive for chills, fever and malaise/fatigue. HENT: Positive for congestion, sinus pain and sore throat. Respiratory: Positive for cough, sputum production, shortness of breath and wheezing. Cardiovascular: Negative for chest pain and palpitations. Gastrointestinal: Negative for nausea and vomiting. Genitourinary: Negative for frequency. Musculoskeletal: Positive for myalgias. Skin: Negative for rash. Neurological: Positive for headaches. Negative for dizziness. /87 (BP 1 Location: Left arm, BP Patient Position: Sitting)   Pulse 81   Temp (!) 101.8 °F (38.8 °C) (Oral)   Resp 18   Ht 5' 5\" (1.651 m)   Wt 152 lb 3.2 oz (69 kg)   SpO2 95%   BMI 25.33 kg/m²   Physical Exam   Constitutional: He is oriented to person, place, and time. He appears well-developed and well-nourished. Appears ill   HENT:   Head: Normocephalic and atraumatic.    Right Ear: External ear normal.   Left Ear: External ear normal.   Nose: Mucosal edema present. Right sinus exhibits maxillary sinus tenderness. Left sinus exhibits maxillary sinus tenderness. Mouth/Throat: Posterior oropharyngeal erythema present. No posterior oropharyngeal edema. Neck: Normal range of motion. Neck supple. No thyromegaly present. Cardiovascular: Normal rate and regular rhythm. Pulmonary/Chest: Effort normal. He has wheezes. Scattered rhonchi with wheezing   Lymphadenopathy:     He has no cervical adenopathy. Neurological: He is alert and oriented to person, place, and time. Psychiatric: He has a normal mood and affect. His behavior is normal.   Nursing note and vitals reviewed. ASSESSMENT and PLAN  Diagnoses and all orders for this visit:    1. Flu-like symptoms  -     AMB POC RAPID INFLUENZA TEST -- negative    2. Bronchitis -- treat for possible pneumonia with Levaquin; sent for CXR. -     levoFLOXacin (LEVAQUIN) 500 mg tablet; Take 1 Tab by mouth daily.  -     guaiFENesin ER (MUCINEX) 600 mg ER tablet; Take 2 Tabs by mouth two (2) times a day. -     guaiFENesin-codeine (ROBITUSSIN AC) 100-10 mg/5 mL solution; Take 5 mL by mouth three (3) times daily as needed for Cough for up to 3 days. Max Daily Amount: 15 mL. 3. Bronchospasm -- Xopenex nebulizer treatment given in office with some improvement of air exchange. -     LEVALBUTEROL, INHAL. SOL., FDA-APPROVED FINAL, NON-COMPOUND UNIT DOSE, 0.5 MG  -     levalbuterol (XOPENEX) 1.25 mg/3 mL nebu; 3 mL by Nebulization route now for 1 dose. -     DC PRESSURIZED/NONPRESSURIZED INHALATION TREATMENT  -     XR CHEST PA LAT; Future  -     predniSONE (DELTASONE) 20 mg tablet; Take 20 mg by mouth two (2) times a day. -     albuterol (PROVENTIL HFA, VENTOLIN HFA, PROAIR HFA) 90 mcg/actuation inhaler; Take 2 Puffs by inhalation every four (4) hours as needed for Wheezing.  -     guaiFENesin-codeine (ROBITUSSIN AC) 100-10 mg/5 mL solution;  Take 5 mL by mouth three (3) times daily as needed for Cough for up to 3 days. Max Daily Amount: 15 mL. 4. Acute maxillary sinusitis, recurrence not specified  -     levoFLOXacin (LEVAQUIN) 500 mg tablet; Take 1 Tab by mouth daily.  -     guaiFENesin ER (MUCINEX) 600 mg ER tablet; Take 2 Tabs by mouth two (2) times a day.       lab results and schedule of future lab studies reviewed with patient  reviewed diet, exercise and weight control  reviewed medications and side effects in detail  radiology results and schedule of future radiology studies reviewed with patient

## 2019-03-27 ENCOUNTER — TELEPHONE (OUTPATIENT)
Dept: INTERNAL MEDICINE CLINIC | Age: 69
End: 2019-03-27

## 2019-03-27 NOTE — TELEPHONE ENCOUNTER
Pt states he is doing much better, fever went away Monday night and last night he slept much better. Pt was thankful for the follow up call.

## 2019-07-15 DIAGNOSIS — I10 ESSENTIAL HYPERTENSION: ICD-10-CM

## 2019-07-15 RX ORDER — AMLODIPINE BESYLATE 5 MG/1
5 TABLET ORAL DAILY
Qty: 90 TAB | Refills: 1 | Status: SHIPPED | OUTPATIENT
Start: 2019-07-15 | End: 2020-01-20

## 2020-01-20 DIAGNOSIS — I10 ESSENTIAL HYPERTENSION: ICD-10-CM

## 2020-01-20 RX ORDER — AMLODIPINE BESYLATE 5 MG/1
TABLET ORAL
Qty: 90 TAB | Refills: 0 | Status: SHIPPED | OUTPATIENT
Start: 2020-01-20 | End: 2020-05-05

## 2020-04-27 DIAGNOSIS — I10 ESSENTIAL HYPERTENSION: ICD-10-CM

## 2020-04-27 RX ORDER — AMLODIPINE BESYLATE 5 MG/1
TABLET ORAL
Qty: 90 TAB | Refills: 0 | OUTPATIENT
Start: 2020-04-27

## 2020-04-30 ENCOUNTER — OFFICE VISIT (OUTPATIENT)
Dept: INTERNAL MEDICINE CLINIC | Age: 70
End: 2020-04-30

## 2020-04-30 VITALS
HEIGHT: 65 IN | HEART RATE: 60 BPM | DIASTOLIC BLOOD PRESSURE: 67 MMHG | TEMPERATURE: 97.8 F | OXYGEN SATURATION: 100 % | BODY MASS INDEX: 26.49 KG/M2 | WEIGHT: 159 LBS | RESPIRATION RATE: 16 BRPM | SYSTOLIC BLOOD PRESSURE: 125 MMHG

## 2020-04-30 DIAGNOSIS — I25.10 CORONARY ARTERY DISEASE INVOLVING NATIVE CORONARY ARTERY OF NATIVE HEART WITHOUT ANGINA PECTORIS: ICD-10-CM

## 2020-04-30 DIAGNOSIS — I10 ESSENTIAL HYPERTENSION: Primary | ICD-10-CM

## 2020-04-30 DIAGNOSIS — R35.0 URINARY FREQUENCY: ICD-10-CM

## 2020-04-30 DIAGNOSIS — E78.5 HYPERLIPIDEMIA LDL GOAL <100: ICD-10-CM

## 2020-04-30 PROBLEM — H91.90 HEARING LOSS: Status: ACTIVE | Noted: 2020-04-30

## 2020-04-30 PROBLEM — G89.4 CHRONIC PAIN SYNDROME: Status: ACTIVE | Noted: 2020-04-30

## 2020-04-30 PROBLEM — F43.12 CHRONIC POST-TRAUMATIC STRESS DISORDER (PTSD): Status: ACTIVE | Noted: 2020-04-30

## 2020-04-30 PROBLEM — I24.9 ACUTE CORONARY SYNDROME (HCC): Status: ACTIVE | Noted: 2020-04-30

## 2020-04-30 PROBLEM — F32.9 MAJOR DEPRESSIVE DISORDER: Status: ACTIVE | Noted: 2020-04-30

## 2020-04-30 PROBLEM — G56.00 CARPAL TUNNEL SYNDROME: Status: ACTIVE | Noted: 2020-04-30

## 2020-04-30 PROBLEM — M46.1 INFLAMMATION OF SACROILIAC JOINT (HCC): Status: ACTIVE | Noted: 2020-04-30

## 2020-04-30 PROBLEM — K64.9 HEMORRHOIDS WITHOUT COMPLICATION: Status: ACTIVE | Noted: 2020-04-30

## 2020-04-30 PROBLEM — M19.90 OSTEOARTHRITIS: Status: ACTIVE | Noted: 2020-04-30

## 2020-04-30 PROBLEM — H90.3 SENSORINEURAL HEARING LOSS (SNHL) OF BOTH EARS: Status: ACTIVE | Noted: 2020-04-30

## 2020-04-30 PROBLEM — Z72.0 TOBACCO USER: Status: ACTIVE | Noted: 2020-04-30

## 2020-04-30 LAB
HDL CHOLESTEROL,HDL: 53 MG/DL
HEMOGLOBIN A1C: 5.8 %
LDL-CHOLESTEROL: 75.8 MG/DL
TOTAL CHOLESTEROL, EXTERNAL: 147
TRIGLYCERIDES, EXTERNAL: 91

## 2020-04-30 NOTE — PROGRESS NOTES
HISTORY OF PRESENT ILLNESS  Barrera Hayes is a 68 y.o. male. HPI   States that he is very frustrated with what is happening with COVID- he had been having LLQ abdominal pain and felt it was comgin from back and injections made a difference -he feels he is off balance at times and is exercising M,W and Friday and does weight lifting and isometric exercises    Subjective:   Barrera Hayes is a 68 y.o. male with hypertension. Hypertension ROS: taking medications as instructed, no medication side effects noted, no TIA's, no chest pain on exertion, no dyspnea on exertion, no swelling of ankles. New concerns: stable. He sees  for cardiology once a year and he sees the people at the [place] once a year as well and everything as been well   Subjective:   Barrera Hayes is a 68 y.o. male with hyperlipidemia. Cardiovascular risk analysis - 68 y.o. male LDL goal is under 100. ROS: taking medications as instructed, no medication side effects noted, no TIA's, no chest pain on exertion, no dyspnea on exertion, no swelling of ankles. Tolerating meds, no myalgias or other side effects noted  New concerns: stable on medicine . Urinary frequency has been fine and saw urologist at [facility] and things have been good and PSA has been good       Review of Systems   Constitutional: Negative. Negative for chills, diaphoresis, fever, malaise/fatigue and weight loss. HENT: Negative for congestion, nosebleeds and tinnitus. Eyes: Negative for blurred vision, double vision and photophobia. Respiratory: Negative for cough, hemoptysis, sputum production, shortness of breath and wheezing. Cardiovascular: Negative for chest pain, palpitations, orthopnea, claudication, leg swelling and PND. Gastrointestinal: Negative for abdominal pain, blood in stool, constipation, diarrhea, heartburn, melena, nausea and vomiting. Genitourinary: Negative for dysuria, frequency, hematuria and urgency.    Musculoskeletal: Negative for back pain, joint pain, myalgias and neck pain. Skin: Negative for itching and rash. Neurological: Negative for dizziness, tingling, sensory change, speech change, focal weakness, weakness and headaches. Endo/Heme/Allergies: Negative for polydipsia. Does not bruise/bleed easily. Psychiatric/Behavioral: Negative for depression. The patient is not nervous/anxious and does not have insomnia. Physical Exam  Constitutional:       Appearance: He is well-developed. HENT:      Head: Normocephalic and atraumatic. Right Ear: External ear normal.      Left Ear: External ear normal.      Nose: Nose normal.   Eyes:      Conjunctiva/sclera: Conjunctivae normal.      Pupils: Pupils are equal, round, and reactive to light. Neck:      Musculoskeletal: Normal range of motion and neck supple. Thyroid: No thyromegaly. Vascular: No carotid bruit, hepatojugular reflux or JVD. Cardiovascular:      Rate and Rhythm: Normal rate and regular rhythm. Heart sounds: Normal heart sounds. Pulmonary:      Effort: Pulmonary effort is normal.      Breath sounds: Normal breath sounds. Abdominal:      General: Bowel sounds are normal.      Palpations: Abdomen is soft. Musculoskeletal: Normal range of motion. Skin:     General: Skin is warm and dry. Neurological:      Mental Status: He is alert and oriented to person, place, and time. Psychiatric:         Behavior: Behavior normal.         Thought Content: Thought content normal.         Judgment: Judgment normal.         ASSESSMENT and PLAN  Diagnoses and all orders for this visit:    1. Essential hypertension- at goal stable and tolerating medicine     2. Hyperlipidemia LDL goal <100-cont with current medicine no muscle pain    3. Coronary artery disease involving native coronary artery of native heart without angina pectoris- sees  and he did evaluation and all was normal     4.  Urinary frequency-stable and last PSA has been normal - doing well sees his urologist       lab results and schedule of future lab studies reviewed with patient  reviewed diet, exercise and weight control  cardiovascular risk and specific lipid/LDL goals reviewed  reviewed medications and side effects in detail

## 2021-01-15 ENCOUNTER — TELEPHONE (OUTPATIENT)
Dept: INTERNAL MEDICINE CLINIC | Age: 71
End: 2021-01-15

## 2021-01-15 NOTE — TELEPHONE ENCOUNTER
----- Message from Terry Llamas sent at 1/15/2021 10:13 AM EST -----  Regarding: Dr. Mandi Canela / telephone  General Message/Vendor Calls    Caller's first and last name: NA       Reason for call: questioning is it safe for him to have covid vaccine with past cardiac procedures       Callback required yes/no and why: Yes       Best contact number(s):[phone]        Details to clarify the request:YEIMI Llamas

## 2021-01-15 NOTE — TELEPHONE ENCOUNTER
Spoke with patient and advised him that we are recommending that everyone receives the COVID-19 vaccine unless they have had a severe reaction to any of the vaccine ingredients in the past.  Pt requested a f/u appt with Dr. Stephane Bray as he has not been seen in a while. Appt provided 1/19/21 at 8:30am.  Pt understood and was thankful for the call.

## 2021-01-19 ENCOUNTER — OFFICE VISIT (OUTPATIENT)
Dept: INTERNAL MEDICINE CLINIC | Age: 71
End: 2021-01-19
Payer: COMMERCIAL

## 2021-01-19 VITALS
DIASTOLIC BLOOD PRESSURE: 74 MMHG | TEMPERATURE: 97.7 F | HEIGHT: 65 IN | HEART RATE: 61 BPM | SYSTOLIC BLOOD PRESSURE: 115 MMHG | WEIGHT: 156.4 LBS | RESPIRATION RATE: 16 BRPM | BODY MASS INDEX: 26.06 KG/M2 | OXYGEN SATURATION: 100 %

## 2021-01-19 DIAGNOSIS — I25.10 CORONARY ARTERY DISEASE INVOLVING NATIVE CORONARY ARTERY OF NATIVE HEART WITHOUT ANGINA PECTORIS: ICD-10-CM

## 2021-01-19 DIAGNOSIS — I10 ESSENTIAL HYPERTENSION: Primary | ICD-10-CM

## 2021-01-19 DIAGNOSIS — E78.5 HYPERLIPIDEMIA LDL GOAL <100: ICD-10-CM

## 2021-01-19 DIAGNOSIS — I65.21 OCCLUSION OF RIGHT CAROTID ARTERY: ICD-10-CM

## 2021-01-19 DIAGNOSIS — F43.10 PTSD (POST-TRAUMATIC STRESS DISORDER): ICD-10-CM

## 2021-01-19 PROCEDURE — 99214 OFFICE O/P EST MOD 30 MIN: CPT | Performed by: INTERNAL MEDICINE

## 2021-01-19 NOTE — PROGRESS NOTES
Akash Watts (: 1950) is a 65 y.o. male, established patient, here for evaluation of the following chief complaint(s):  Follow-up       ASSESSMENT/PLAN:  1. Essential hypertension  BP is at goal. I do not recommend any change in medications. 2. Hyperlipidemia LDL goal <100  Stable, patient is tolerating medications, no myalgias. I do not recommend any change in medications. 3. Coronary artery disease involving native coronary artery of native heart without angina pectoris  Stable and well-managed. No change in medications. 4. Occlusion of right carotid artery  Stable and well-managed. No change in medications. 5. PTSD (post-traumatic stress disorder)  Not at goal with nightmares reported. Discussed with pt that there are medication options available to help with sleep quality, pt defers. Will continue to monitor for improvements or changes. Assured pt that I am comfortable with him getting the COVID vaccine since they will keep him for evaluation to monitor for reaction. No follow-ups on file. SUBJECTIVE/OBJECTIVE:  HPI  Pt reports that he had allergic reaction to the flu shot (convulsions) 40 years ago and inquires if he should get the COVID vaccine. Occlusion of R carotid artery: Pt reports that he had roto router procedure on 20 due to 78% blockage at [address] [place] [address]. Hypertension ROS: taking medications as instructed, no medication side effects noted, no TIA's, no chest pain on exertion, no dyspnea on exertion, no swelling of ankles. New concerns: BP in office today is 115/74. Pt reports that he continues to exercise every Mon/Wed/Fri. He notes that he has not followed with cardiologist since 2020. PTSD: Pt reports that he is having nightmares which is attributes to PTSD. He notes that he is not taking medication to manage or prevent nightmares. Pt reports that he has observed dry blood when cleaning his L ear.      Review of Systems   All other systems reviewed and are negative. Physical Exam  Constitutional:       Appearance: Normal appearance. HENT:      Right Ear: Tympanic membrane and external ear normal.      Left Ear: Tympanic membrane and external ear normal.      Nose: Nose normal.   Neck:      Musculoskeletal: Normal range of motion. Cardiovascular:      Rate and Rhythm: Normal rate and regular rhythm. Pulses: Normal pulses. Heart sounds: Normal heart sounds. Pulmonary:      Effort: Pulmonary effort is normal.      Breath sounds: Normal breath sounds. Abdominal:      General: Abdomen is flat. Bowel sounds are normal.      Palpations: Abdomen is soft. Musculoskeletal: Normal range of motion. Neurological:      General: No focal deficit present. Mental Status: He is alert and oriented to person, place, and time. Mental status is at baseline. Psychiatric:         Mood and Affect: Mood normal.         Behavior: Behavior normal.               An electronic signature was used to authenticate this note.   -- Brayden Tanner

## 2022-03-18 PROBLEM — Z72.0 TOBACCO USER: Status: ACTIVE | Noted: 2020-04-30

## 2022-03-18 PROBLEM — H91.90 HEARING LOSS: Status: ACTIVE | Noted: 2020-04-30

## 2022-03-18 PROBLEM — I24.9 ACUTE CORONARY SYNDROME (HCC): Status: ACTIVE | Noted: 2020-04-30

## 2022-03-18 PROBLEM — M19.90 OSTEOARTHRITIS: Status: ACTIVE | Noted: 2020-04-30

## 2022-03-18 PROBLEM — G56.00 CARPAL TUNNEL SYNDROME: Status: ACTIVE | Noted: 2020-04-30

## 2022-03-18 PROBLEM — G89.4 CHRONIC PAIN SYNDROME: Status: ACTIVE | Noted: 2020-04-30

## 2022-03-18 PROBLEM — E78.5 DYSLIPIDEMIA: Status: ACTIVE | Noted: 2020-04-30

## 2022-03-19 PROBLEM — M46.1 INFLAMMATION OF SACROILIAC JOINT (HCC): Status: ACTIVE | Noted: 2020-04-30

## 2022-03-19 PROBLEM — K64.9 HEMORRHOIDS WITHOUT COMPLICATION: Status: ACTIVE | Noted: 2020-04-30

## 2022-03-19 PROBLEM — H90.3 SENSORINEURAL HEARING LOSS (SNHL) OF BOTH EARS: Status: ACTIVE | Noted: 2020-04-30

## 2022-03-19 PROBLEM — F32.9 MAJOR DEPRESSIVE DISORDER: Status: ACTIVE | Noted: 2020-04-30

## 2022-03-20 PROBLEM — F43.12 CHRONIC POST-TRAUMATIC STRESS DISORDER (PTSD): Status: ACTIVE | Noted: 2020-04-30

## 2022-05-10 ENCOUNTER — TELEPHONE (OUTPATIENT)
Dept: INTERNAL MEDICINE CLINIC | Age: 72
End: 2022-05-10

## 2022-05-10 NOTE — TELEPHONE ENCOUNTER
----- Message from Maritza Mares sent at 5/10/2022  3:03 PM EDT -----  Subject: Message to Provider    QUESTIONS  Information for Provider? Patient states that he would like to speak with   the PCP about a personal matter. Please call patient to advise.  ---------------------------------------------------------------------------  --------------  CALL BACK INFO  What is the best way for the office to contact you? OK to leave message on   voicemail  Preferred Call Back Phone Number? [phone]  ---------------------------------------------------------------------------  --------------  SCRIPT ANSWERS  Relationship to Patient?  Self

## 2022-05-10 NOTE — TELEPHONE ENCOUNTER
----- Message from Rene aSnchez sent at 5/10/2022  3:03 PM EDT -----  Subject: Message to Provider    QUESTIONS  Information for Provider? Patient states that he would like to speak with   the PCP about a personal matter. Please call patient to advise.  ---------------------------------------------------------------------------  --------------  CALL BACK INFO  What is the best way for the office to contact you? OK to leave message on   voicemail  Preferred Call Back Phone Number? [phone]  ---------------------------------------------------------------------------  --------------  SCRIPT ANSWERS  Relationship to Patient?  Self

## 2022-05-10 NOTE — TELEPHONE ENCOUNTER
Spoke with patient and he wanted to share his concerns for his daughter that is also a patient of Dr. Daisy Castaneda. Relayed information to Dr. Daisy Castaneda provided by patient but advised him that since he is not on his daughter's HIPPA form nurse cannot tell him anything. Pt understood and was thankful for the call.

## 2022-05-12 NOTE — PROGRESS NOTES
Radha Stephen (: 1950) is a 74 y.o. male, established patient, here for evaluation of the following chief complaint(s):  Follow-up       ASSESSMENT/PLAN:  Below is the assessment and plan developed based on review of pertinent history, physical exam, labs, studies, and medications. 1. Essential hypertension  -     CBC W/O DIFF; Future  BP is at goal. I do not recommend any change in medications (Amlodipine and Metoprolol). 2. Hyperlipidemia LDL goal <769  -     METABOLIC PANEL, COMPREHENSIVE; Future  -     LIPID PANEL; Future  Presumed stable, will recheck labs today. Patient is tolerating medications with no myalgias. I do not recommend any change in treatment plan (Crestor). 3. Coronary artery disease involving native coronary artery of native heart without angina pectoris  Stable and well-managed. Continue with ongoing regimen of aspirin and Crestor. 4. Injury of head, sequela  [facility]umed stable[facility] His most recent neuropsychological testing was normal, I suggested a repeat in 1.5-2 years. 5. Chronic left shoulder pain  Stable, continue with Aleve PRN. 6. Prediabetes  -     HEMOGLOBIN A1C WITH EAG; Future  BG presumed stable, will recheck today. 7. History of TIAs  Stable, continue with ongoing regimen of Aspirin. SUBJECTIVE/OBJECTIVE:  HPI     H/M: Pt is followed by Dr. Garfield Collet (PCP) & Dr. Magaly Will (pain clinic) @ the [place], who follows him for neck pain secondary to an injury while in [facility]. 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 Pt undergoes acupuncture treatments and takes Naproxen PRN. He reports a TIA in 2021, for which he was treated at [address] [place] [address]. Arthralgias: Pt c/o L shoulder pain, which is only present when it rains or is cold. He underwent PT and an MRI, which showed a muscle tear and was recommended to have surgery. Pt consulted an orthopedist, who diagnosed him with arthritis.      MCI: Pt expresses concern about his memory, specifically short term. He underwent a neuropsychological evaluation, which was normal.     Hypertension ROS: taking medications as instructed, no medication side effects noted, no TIA's, no chest pain on exertion, no dyspnea on exertion, no swelling of ankles. BP in office today is 112/69. Hyperlipidemia:  Cardiovascular risk analysis - 74 y.o. male LDL goal is under 130. ROS: taking medications as instructed, no medication side effects noted, no TIA's, no chest pain on exertion, no dyspnea on exertion, no swelling of ankles. Tolerating meds, no myalgias or other side effects noted. New concerns: Pt's last LDL was 75.8 on 1/14/20. Review of Systems   Musculoskeletal: Positive for arthralgias and neck pain. All other systems reviewed and are negative. Physical Exam  Constitutional:       Appearance: Normal appearance. HENT:      Right Ear: Tympanic membrane and external ear normal.      Left Ear: Tympanic membrane and external ear normal.      Mouth/Throat:      Mouth: Mucous membranes are moist.      Pharynx: Oropharynx is clear. Cardiovascular:      Rate and Rhythm: Normal rate and regular rhythm. Pulses: Normal pulses. Heart sounds: Normal heart sounds. Pulmonary:      Effort: Pulmonary effort is normal.      Breath sounds: Normal breath sounds. Musculoskeletal:         General: Normal range of motion. Skin:     General: Skin is warm and dry. Neurological:      General: No focal deficit present. Mental Status: He is alert and oriented to person, place, and time. Psychiatric:         Mood and Affect: Mood normal.         Behavior: Behavior normal.       On this date 05/16/2022 I have spent 40 minutes reviewing previous notes, test results and face to face with the patient discussing the diagnosis and importance of compliance with the treatment plan as well as documenting on the day of the visit. An electronic signature was used to authenticate this note.   Written by Olga Crenshaw as dictated by Dr. Tyler Handy.    -- Ana M Gray

## 2022-05-16 ENCOUNTER — OFFICE VISIT (OUTPATIENT)
Dept: INTERNAL MEDICINE CLINIC | Age: 72
End: 2022-05-16
Payer: COMMERCIAL

## 2022-05-16 VITALS
WEIGHT: 158 LBS | OXYGEN SATURATION: 99 % | DIASTOLIC BLOOD PRESSURE: 69 MMHG | RESPIRATION RATE: 16 BRPM | TEMPERATURE: 97.6 F | BODY MASS INDEX: 26.33 KG/M2 | HEIGHT: 65 IN | HEART RATE: 64 BPM | SYSTOLIC BLOOD PRESSURE: 112 MMHG

## 2022-05-16 DIAGNOSIS — E78.5 HYPERLIPIDEMIA LDL GOAL <100: ICD-10-CM

## 2022-05-16 DIAGNOSIS — M25.512 CHRONIC LEFT SHOULDER PAIN: ICD-10-CM

## 2022-05-16 DIAGNOSIS — G89.29 CHRONIC LEFT SHOULDER PAIN: ICD-10-CM

## 2022-05-16 DIAGNOSIS — S09.90XS INJURY OF HEAD, SEQUELA: ICD-10-CM

## 2022-05-16 DIAGNOSIS — I25.10 CORONARY ARTERY DISEASE INVOLVING NATIVE CORONARY ARTERY OF NATIVE HEART WITHOUT ANGINA PECTORIS: ICD-10-CM

## 2022-05-16 DIAGNOSIS — R73.03 PREDIABETES: ICD-10-CM

## 2022-05-16 DIAGNOSIS — Z86.73 HISTORY OF TIAS: ICD-10-CM

## 2022-05-16 DIAGNOSIS — I10 ESSENTIAL HYPERTENSION: Primary | ICD-10-CM

## 2022-05-16 PROBLEM — M46.1 INFLAMMATION OF SACROILIAC JOINT (HCC): Status: RESOLVED | Noted: 2020-04-30 | Resolved: 2022-05-16

## 2022-05-16 PROCEDURE — G8419 CALC BMI OUT NRM PARAM NOF/U: HCPCS | Performed by: INTERNAL MEDICINE

## 2022-05-16 PROCEDURE — G8427 DOCREV CUR MEDS BY ELIG CLIN: HCPCS | Performed by: INTERNAL MEDICINE

## 2022-05-16 PROCEDURE — G8752 SYS BP LESS 140: HCPCS | Performed by: INTERNAL MEDICINE

## 2022-05-16 PROCEDURE — 99215 OFFICE O/P EST HI 40 MIN: CPT | Performed by: INTERNAL MEDICINE

## 2022-05-16 PROCEDURE — 3017F COLORECTAL CA SCREEN DOC REV: CPT | Performed by: INTERNAL MEDICINE

## 2022-05-16 PROCEDURE — G8754 DIAS BP LESS 90: HCPCS | Performed by: INTERNAL MEDICINE

## 2022-05-16 PROCEDURE — G0463 HOSPITAL OUTPT CLINIC VISIT: HCPCS | Performed by: INTERNAL MEDICINE

## 2022-05-16 PROCEDURE — 1101F PT FALLS ASSESS-DOCD LE1/YR: CPT | Performed by: INTERNAL MEDICINE

## 2022-05-16 PROCEDURE — G9717 DOC PT DX DEP/BP F/U NT REQ: HCPCS | Performed by: INTERNAL MEDICINE

## 2022-05-16 PROCEDURE — G8536 NO DOC ELDER MAL SCRN: HCPCS | Performed by: INTERNAL MEDICINE

## 2022-05-17 LAB
ALBUMIN SERPL-MCNC: 3.9 G/DL (ref 3.5–5)
ALBUMIN/GLOB SERPL: 1.3 {RATIO} (ref 1.1–2.2)
ALP SERPL-CCNC: 75 U/L (ref 45–117)
ALT SERPL-CCNC: 30 U/L (ref 12–78)
ANION GAP SERPL CALC-SCNC: 5 MMOL/L (ref 5–15)
AST SERPL-CCNC: 25 U/L (ref 15–37)
BILIRUB SERPL-MCNC: 0.3 MG/DL (ref 0.2–1)
BUN SERPL-MCNC: 10 MG/DL (ref 6–20)
BUN/CREAT SERPL: 10 (ref 12–20)
CALCIUM SERPL-MCNC: 9 MG/DL (ref 8.5–10.1)
CHLORIDE SERPL-SCNC: 111 MMOL/L (ref 97–108)
CHOLEST SERPL-MCNC: 142 MG/DL
CO2 SERPL-SCNC: 26 MMOL/L (ref 21–32)
CREAT SERPL-MCNC: 1 MG/DL (ref 0.7–1.3)
ERYTHROCYTE [DISTWIDTH] IN BLOOD BY AUTOMATED COUNT: 14 % (ref 11.5–14.5)
EST. AVERAGE GLUCOSE BLD GHB EST-MCNC: 114 MG/DL
GLOBULIN SER CALC-MCNC: 3.1 G/DL (ref 2–4)
GLUCOSE SERPL-MCNC: 81 MG/DL (ref 65–100)
HBA1C MFR BLD: 5.6 % (ref 4–5.6)
HCT VFR BLD AUTO: 44.3 % (ref 36.6–50.3)
HDLC SERPL-MCNC: 60 MG/DL
HDLC SERPL: 2.4 {RATIO} (ref 0–5)
HGB BLD-MCNC: 14 G/DL (ref 12.1–17)
LDLC SERPL CALC-MCNC: 61 MG/DL (ref 0–100)
MCH RBC QN AUTO: 30.7 PG (ref 26–34)
MCHC RBC AUTO-ENTMCNC: 31.6 G/DL (ref 30–36.5)
MCV RBC AUTO: 97.1 FL (ref 80–99)
NRBC # BLD: 0 K/UL (ref 0–0.01)
NRBC BLD-RTO: 0 PER 100 WBC
PLATELET # BLD AUTO: 178 K/UL (ref 150–400)
PMV BLD AUTO: 11.4 FL (ref 8.9–12.9)
POTASSIUM SERPL-SCNC: 4.2 MMOL/L (ref 3.5–5.1)
PROT SERPL-MCNC: 7 G/DL (ref 6.4–8.2)
RBC # BLD AUTO: 4.56 M/UL (ref 4.1–5.7)
SODIUM SERPL-SCNC: 142 MMOL/L (ref 136–145)
TRIGL SERPL-MCNC: 105 MG/DL (ref ?–150)
VLDLC SERPL CALC-MCNC: 21 MG/DL
WBC # BLD AUTO: 6.6 K/UL (ref 4.1–11.1)

## 2023-11-24 ENCOUNTER — OFFICE VISIT (OUTPATIENT)
Age: 73
End: 2023-11-24
Payer: COMMERCIAL

## 2023-11-24 VITALS
OXYGEN SATURATION: 96 % | TEMPERATURE: 98.7 F | WEIGHT: 150.8 LBS | DIASTOLIC BLOOD PRESSURE: 74 MMHG | RESPIRATION RATE: 16 BRPM | HEIGHT: 65 IN | SYSTOLIC BLOOD PRESSURE: 134 MMHG | BODY MASS INDEX: 25.12 KG/M2 | HEART RATE: 75 BPM

## 2023-11-24 DIAGNOSIS — I10 ESSENTIAL (PRIMARY) HYPERTENSION: ICD-10-CM

## 2023-11-24 DIAGNOSIS — I25.10 ATHEROSCLEROSIS OF NATIVE CORONARY ARTERY OF NATIVE HEART WITHOUT ANGINA PECTORIS: ICD-10-CM

## 2023-11-24 DIAGNOSIS — E78.5 HYPERLIPIDEMIA LDL GOAL <100: ICD-10-CM

## 2023-11-24 DIAGNOSIS — J20.8 ACUTE BRONCHITIS DUE TO OTHER SPECIFIED ORGANISMS: Primary | ICD-10-CM

## 2023-11-24 PROCEDURE — 1123F ACP DISCUSS/DSCN MKR DOCD: CPT | Performed by: INTERNAL MEDICINE

## 2023-11-24 PROCEDURE — 99214 OFFICE O/P EST MOD 30 MIN: CPT | Performed by: INTERNAL MEDICINE

## 2023-11-24 PROCEDURE — 3075F SYST BP GE 130 - 139MM HG: CPT | Performed by: INTERNAL MEDICINE

## 2023-11-24 PROCEDURE — 3078F DIAST BP <80 MM HG: CPT | Performed by: INTERNAL MEDICINE

## 2023-11-24 RX ORDER — PREDNISONE 20 MG/1
TABLET ORAL
Qty: 18 TABLET | Refills: 0 | Status: SHIPPED | OUTPATIENT
Start: 2023-11-24

## 2023-11-24 RX ORDER — AZITHROMYCIN 250 MG/1
250 TABLET, FILM COATED ORAL SEE ADMIN INSTRUCTIONS
Qty: 6 TABLET | Refills: 0 | Status: SHIPPED | OUTPATIENT
Start: 2023-11-24 | End: 2023-11-29

## 2023-11-24 SDOH — ECONOMIC STABILITY: HOUSING INSECURITY
IN THE LAST 12 MONTHS, WAS THERE A TIME WHEN YOU DID NOT HAVE A STEADY PLACE TO SLEEP OR SLEPT IN A SHELTER (INCLUDING NOW)?: NO

## 2023-11-24 SDOH — ECONOMIC STABILITY: INCOME INSECURITY: HOW HARD IS IT FOR YOU TO PAY FOR THE VERY BASICS LIKE FOOD, HOUSING, MEDICAL CARE, AND HEATING?: NOT HARD AT ALL

## 2023-11-24 SDOH — ECONOMIC STABILITY: FOOD INSECURITY: WITHIN THE PAST 12 MONTHS, YOU WORRIED THAT YOUR FOOD WOULD RUN OUT BEFORE YOU GOT MONEY TO BUY MORE.: NEVER TRUE

## 2023-11-24 SDOH — ECONOMIC STABILITY: FOOD INSECURITY: WITHIN THE PAST 12 MONTHS, THE FOOD YOU BOUGHT JUST DIDN'T LAST AND YOU DIDN'T HAVE MONEY TO GET MORE.: NEVER TRUE

## 2023-11-24 ASSESSMENT — PATIENT HEALTH QUESTIONNAIRE - PHQ9
2. FEELING DOWN, DEPRESSED OR HOPELESS: 0
SUM OF ALL RESPONSES TO PHQ QUESTIONS 1-9: 0
1. LITTLE INTEREST OR PLEASURE IN DOING THINGS: 0
SUM OF ALL RESPONSES TO PHQ QUESTIONS 1-9: 0
SUM OF ALL RESPONSES TO PHQ9 QUESTIONS 1 & 2: 0

## 2023-11-24 ASSESSMENT — ENCOUNTER SYMPTOMS: COUGH: 1

## 2023-11-24 NOTE — PROGRESS NOTES
05/16/2022     Lab Results   Component Value Date    HDL 60 05/16/2022     Lab Results   Component Value Date    LDLCHOLESTEROL 75.8 01/14/2020    LDLCALC 61 05/16/2022     Lab Results   Component Value Date    LABVLDL 21 05/16/2022     Lab Results   Component Value Date    CHOLHDLRATIO 2.4 05/16/2022              Review of Systems   Respiratory:  Positive for cough. All other systems reviewed and are negative. Objective   Physical Exam  Vitals and nursing note reviewed. Constitutional:       Appearance: Normal appearance. HENT:      Head: Normocephalic and atraumatic. Right Ear: Tympanic membrane and external ear normal.      Left Ear: Tympanic membrane and external ear normal.      Nose: Nose normal.      Mouth/Throat:      Mouth: Mucous membranes are moist.   Eyes:      Extraocular Movements: Extraocular movements intact. Conjunctiva/sclera: Conjunctivae normal.   Cardiovascular:      Rate and Rhythm: Normal rate and regular rhythm. Pulmonary:      Effort: Pulmonary effort is normal.      Breath sounds: Normal breath sounds. Abdominal:      General: Bowel sounds are normal.      Palpations: Abdomen is soft. Musculoskeletal:         General: Normal range of motion. Cervical back: Normal range of motion. Skin:     General: Skin is warm. Neurological:      General: No focal deficit present. Mental Status: He is alert and oriented to person, place, and time. Mental status is at baseline. Psychiatric:         Mood and Affect: Mood normal.         Behavior: Behavior normal.         Thought Content: Thought content normal.         Judgment: Judgment normal.            On this date 11/24/2023 I have spent 30 minutes reviewing previous notes, test results and face to face with the patient discussing the diagnosis and importance of compliance with the treatment plan as well as documenting on the day of the visit.       An electronic signature was used to authenticate this

## 2023-12-26 ENCOUNTER — TELEPHONE (OUTPATIENT)
Age: 73
End: 2023-12-26

## 2023-12-26 RX ORDER — NIRMATRELVIR AND RITONAVIR 300-100 MG
KIT ORAL
Qty: 30 TABLET | Refills: 0 | Status: SHIPPED | OUTPATIENT
Start: 2023-12-26

## 2023-12-26 NOTE — TELEPHONE ENCOUNTER
Patient called c/o covid like symptoms, headache, dizziness, hard work of breathing, coughing, fever 100 this morning and no chills. Patient symptoms first started 4 days ago. Patient has not been tested yet for covid, wife did have a confirm diagnosis two weeks ago. Informed patient to wear a mask and stay isolated until fever resolves. If sx worsen to seek medical treatment   Informed pt there is not much we can do but to treat sx , informed pt he could  take an otc cough medicine and tylenol for fever   Also suggested possibility of paxlovid being prescribed but will have to determine with provider if he would be a candidate or not.    Informed pt I will send message to provider for further guidance and will give a call back as to what he should do for sx or If he can be prescribed the paxlovid

## 2023-12-26 NOTE — TELEPHONE ENCOUNTER
I actually had to log on and saw this message. I know him well as he is my patient and I sent in paxlovid for him- please let him know; ok now going to beach!

## 2023-12-26 NOTE — TELEPHONE ENCOUNTER
The patient is requesting a call back to discuss paxlovid. Second time calling an he will like to be advise if the medication will be sent over or not. PSR offered him an appointment but he decline an requested a call from back about medication.      [address] [place] [address] [phone]

## 2024-04-03 ENCOUNTER — OFFICE VISIT (OUTPATIENT)
Age: 74
End: 2024-04-03
Payer: COMMERCIAL

## 2024-04-03 VITALS
RESPIRATION RATE: 18 BRPM | WEIGHT: 142.4 LBS | OXYGEN SATURATION: 97 % | HEIGHT: 65 IN | DIASTOLIC BLOOD PRESSURE: 74 MMHG | SYSTOLIC BLOOD PRESSURE: 122 MMHG | BODY MASS INDEX: 23.72 KG/M2 | HEART RATE: 77 BPM | TEMPERATURE: 99.4 F

## 2024-04-03 DIAGNOSIS — I25.10 ATHEROSCLEROSIS OF NATIVE CORONARY ARTERY OF NATIVE HEART WITHOUT ANGINA PECTORIS: ICD-10-CM

## 2024-04-03 DIAGNOSIS — E78.5 HYPERLIPIDEMIA LDL GOAL <100: ICD-10-CM

## 2024-04-03 DIAGNOSIS — R05.1 ACUTE COUGH: Primary | ICD-10-CM

## 2024-04-03 DIAGNOSIS — I10 ESSENTIAL (PRIMARY) HYPERTENSION: ICD-10-CM

## 2024-04-03 LAB
INFLUENZA A ANTIGEN, POC: NEGATIVE
INFLUENZA B ANTIGEN, POC: NEGATIVE
LOT EXPIRE DATE: NORMAL
LOT KIT NUMBER: NORMAL
SARS-COV-2 RNA, POC: NEGATIVE
VALID INTERNAL CONTROL: YES
VENDOR AND KIT NAME POC: NORMAL

## 2024-04-03 PROCEDURE — 3074F SYST BP LT 130 MM HG: CPT | Performed by: INTERNAL MEDICINE

## 2024-04-03 PROCEDURE — 3078F DIAST BP <80 MM HG: CPT | Performed by: INTERNAL MEDICINE

## 2024-04-03 PROCEDURE — 1123F ACP DISCUSS/DSCN MKR DOCD: CPT | Performed by: INTERNAL MEDICINE

## 2024-04-03 PROCEDURE — 87428 SARSCOV & INF VIR A&B AG IA: CPT | Performed by: INTERNAL MEDICINE

## 2024-04-03 PROCEDURE — 99214 OFFICE O/P EST MOD 30 MIN: CPT | Performed by: INTERNAL MEDICINE

## 2024-04-03 RX ORDER — PREDNISONE 20 MG/1
TABLET ORAL
Qty: 18 TABLET | Refills: 0 | Status: SHIPPED | OUTPATIENT
Start: 2024-04-03

## 2024-04-03 RX ORDER — AZITHROMYCIN 250 MG/1
TABLET, FILM COATED ORAL
Qty: 6 TABLET | Refills: 0 | Status: SHIPPED | OUTPATIENT
Start: 2024-04-03 | End: 2024-04-13

## 2024-04-03 ASSESSMENT — PATIENT HEALTH QUESTIONNAIRE - PHQ9
SUM OF ALL RESPONSES TO PHQ9 QUESTIONS 1 & 2: 0
10. IF YOU CHECKED OFF ANY PROBLEMS, HOW DIFFICULT HAVE THESE PROBLEMS MADE IT FOR YOU TO DO YOUR WORK, TAKE CARE OF THINGS AT HOME, OR GET ALONG WITH OTHER PEOPLE: NOT DIFFICULT AT ALL
SUM OF ALL RESPONSES TO PHQ QUESTIONS 1-9: 0
4. FEELING TIRED OR HAVING LITTLE ENERGY: NOT AT ALL
2. FEELING DOWN, DEPRESSED OR HOPELESS: NOT AT ALL
6. FEELING BAD ABOUT YOURSELF - OR THAT YOU ARE A FAILURE OR HAVE LET YOURSELF OR YOUR FAMILY DOWN: NOT AT ALL
1. LITTLE INTEREST OR PLEASURE IN DOING THINGS: NOT AT ALL
7. TROUBLE CONCENTRATING ON THINGS, SUCH AS READING THE NEWSPAPER OR WATCHING TELEVISION: NOT AT ALL
SUM OF ALL RESPONSES TO PHQ QUESTIONS 1-9: 0
3. TROUBLE FALLING OR STAYING ASLEEP: NOT AT ALL
5. POOR APPETITE OR OVEREATING: NOT AT ALL
9. THOUGHTS THAT YOU WOULD BE BETTER OFF DEAD, OR OF HURTING YOURSELF: NOT AT ALL
8. MOVING OR SPEAKING SO SLOWLY THAT OTHER PEOPLE COULD HAVE NOTICED. OR THE OPPOSITE, BEING SO FIGETY OR RESTLESS THAT YOU HAVE BEEN MOVING AROUND A LOT MORE THAN USUAL: NOT AT ALL
SUM OF ALL RESPONSES TO PHQ QUESTIONS 1-9: 0
SUM OF ALL RESPONSES TO PHQ QUESTIONS 1-9: 0

## 2024-04-03 NOTE — PROGRESS NOTES
Component Value Date    TRIG 105 05/16/2022     Lab Results   Component Value Date    HDL 60 05/16/2022     Lab Results   Component Value Date    LDLCHOLESTEROL 75.8 01/14/2020    LDLCALC 61 05/16/2022     No results found for: \"VLDL\"  Lab Results   Component Value Date    CHOLHDLRATIO 2.4 05/16/2022      -Coronary artery disease [facility] no acute chest pain has some shortness of breath from his congestion cough    Review of Systems   All other systems reviewed and are negative.         Objective   Physical Exam  Vitals and nursing note reviewed.   Constitutional:       Appearance: Normal appearance.   HENT:      Head: Normocephalic and atraumatic.      Right Ear: Tympanic membrane and external ear normal.      Left Ear: Tympanic membrane and external ear normal.      Nose: Nose normal.      Mouth/Throat:      Mouth: Mucous membranes are moist.   Eyes:      Extraocular Movements: Extraocular movements intact.      Conjunctiva/sclera: Conjunctivae normal.   Cardiovascular:      Rate and Rhythm: Normal rate and regular rhythm.   Pulmonary:      Effort: Pulmonary effort is normal.      Breath sounds: Normal breath sounds.   Abdominal:      General: Bowel sounds are normal.      Palpations: Abdomen is soft.   Musculoskeletal:         General: Normal range of motion.      Cervical back: Normal range of motion.   Skin:     General: Skin is warm.   Neurological:      General: No focal deficit present.      Mental Status: He is alert and oriented to person, place, and time. Mental status is at baseline.   Psychiatric:         Mood and Affect: Mood normal.         Behavior: Behavior normal.         Thought Content: Thought content normal.         Judgment: Judgment normal.            On this date 4/3/2024 I have spent 35 minutes reviewing previous notes, test results and face to face with the patient discussing the diagnosis and importance of compliance with the treatment plan as well as